# Patient Record
(demographics unavailable — no encounter records)

---

## 2018-08-25 NOTE — CT
CT HEAD WITHOUT CONTRAST:

Multiple axial tomograms were obtained through the head without IV enhancement.

 

INDICATION: 

Fall with injury to head.

 

FINDINGS: 

Ventricles have normal size and position.  No evidence of intracranial hemorrhage or infarct.  No mas
s or edema seen.  Sinuses and mastoids are well aerated.  

 

IMPRESSION: 

No evidence of acute process.

 

POS: [facility]